# Patient Record
Sex: FEMALE | Race: WHITE | URBAN - METROPOLITAN AREA
[De-identification: names, ages, dates, MRNs, and addresses within clinical notes are randomized per-mention and may not be internally consistent; named-entity substitution may affect disease eponyms.]

---

## 2022-05-13 ENCOUNTER — OFFICE VISIT (OUTPATIENT)
Dept: URBAN - METROPOLITAN AREA CLINIC 27 | Facility: CLINIC | Age: 72
End: 2022-05-13
Payer: MEDICARE

## 2022-05-13 DIAGNOSIS — H04.123 DRY EYE SYNDROME OF BILATERAL LACRIMAL GLANDS: ICD-10-CM

## 2022-05-13 DIAGNOSIS — H43.813 VITREOUS DEGENERATION, BILATERAL: ICD-10-CM

## 2022-05-13 DIAGNOSIS — H35.349 MACULAR HOLE: Primary | ICD-10-CM

## 2022-05-13 DIAGNOSIS — H26.9 CATARACT: ICD-10-CM

## 2022-05-13 PROCEDURE — 92134 CPTRZ OPH DX IMG PST SGM RTA: CPT | Performed by: OPHTHALMOLOGY

## 2022-05-13 PROCEDURE — 99204 OFFICE O/P NEW MOD 45 MIN: CPT | Performed by: OPHTHALMOLOGY

## 2022-05-13 RX ORDER — MOXIFLOXACIN HYDROCHLORIDE 5 MG/ML
0.5 % SOLUTION/ DROPS OPHTHALMIC
Qty: 5 | Refills: 3 | Status: ACTIVE
Start: 2022-05-13

## 2022-05-13 RX ORDER — PREDNISOLONE ACETATE 10 MG/ML
1 % SUSPENSION/ DROPS OPHTHALMIC
Qty: 5 | Refills: 3 | Status: ACTIVE
Start: 2022-05-13

## 2022-05-13 ASSESSMENT — INTRAOCULAR PRESSURE
OD: 12
OS: 15

## 2022-05-13 NOTE — IMPRESSION/PLAN
Impression: Macular hole, OS. Plan: The patient notes decreased VA OS x 1 month. Exam and OCT reveal an macular hole OS (428 microns). Recommend surgery. RBA discussed. The patient elects surgery. Thanks, Dominguez Baez Plan: 25 g PPV / MP ILM / ICG / PRP / C3F8 gas OS

## 2022-05-23 ENCOUNTER — SURGERY (OUTPATIENT)
Dept: URBAN - METROPOLITAN AREA EXTERNAL CLINIC 26 | Facility: EXTERNAL CLINIC | Age: 72
End: 2022-05-23
Payer: MEDICARE

## 2022-05-23 PROCEDURE — 67041 VIT FOR MACULAR PUCKER: CPT | Performed by: OPHTHALMOLOGY

## 2022-05-24 ENCOUNTER — POST-OPERATIVE VISIT (OUTPATIENT)
Dept: URBAN - METROPOLITAN AREA CLINIC 27 | Facility: CLINIC | Age: 72
End: 2022-05-24
Payer: MEDICARE

## 2022-05-24 PROCEDURE — 99024 POSTOP FOLLOW-UP VISIT: CPT | Performed by: OPHTHALMOLOGY

## 2022-05-24 ASSESSMENT — INTRAOCULAR PRESSURE
OS: 14
OD: 15

## 2022-05-24 NOTE — IMPRESSION/PLAN
Impression: S/P  25 g PPV / MP ILM / ICG / PRP / C3F8 gas OS OS - 1 Day. Macular hole  H35.349. Plan: Doing well. Start Rx, alt precautions, and positioning.   Return 1 week (0 [facility])

## 2022-06-01 ENCOUNTER — POST-OPERATIVE VISIT (OUTPATIENT)
Dept: URBAN - METROPOLITAN AREA CLINIC 27 | Facility: CLINIC | Age: 72
End: 2022-06-01
Payer: MEDICARE

## 2022-06-01 DIAGNOSIS — H35.349 MACULAR HOLE: Primary | ICD-10-CM

## 2022-06-01 PROCEDURE — 99024 POSTOP FOLLOW-UP VISIT: CPT | Performed by: OPHTHALMOLOGY

## 2022-06-01 ASSESSMENT — INTRAOCULAR PRESSURE
OD: 15
OS: 14

## 2022-06-01 NOTE — IMPRESSION/PLAN
Impression: S/P 25 g PPV / MP ILM / ICG / PRP / C3F8 gas OS - 05/23/2022 DYK Plan: Retina is attached. No s/s of infection IOP is good at (14) Drops Vigamox QID until done Pred QID 

RTC 3 months, OCT OU

## 2022-08-31 ENCOUNTER — OFFICE VISIT (OUTPATIENT)
Dept: URBAN - METROPOLITAN AREA CLINIC 27 | Facility: CLINIC | Age: 72
End: 2022-08-31
Payer: MEDICARE

## 2022-08-31 DIAGNOSIS — H35.349 MACULAR HOLE: Primary | ICD-10-CM

## 2022-08-31 DIAGNOSIS — H04.123 DRY EYE SYNDROME OF BILATERAL LACRIMAL GLANDS: ICD-10-CM

## 2022-08-31 DIAGNOSIS — H43.813 VITREOUS DEGENERATION, BILATERAL: ICD-10-CM

## 2022-08-31 DIAGNOSIS — H26.9 CATARACT: ICD-10-CM

## 2022-08-31 PROCEDURE — 92134 CPTRZ OPH DX IMG PST SGM RTA: CPT | Performed by: OPHTHALMOLOGY

## 2022-08-31 PROCEDURE — 99214 OFFICE O/P EST MOD 30 MIN: CPT | Performed by: OPHTHALMOLOGY

## 2022-08-31 ASSESSMENT — INTRAOCULAR PRESSURE
OS: 16
OD: 21

## 2022-08-31 NOTE — IMPRESSION/PLAN
Impression: h/o Macular Hole, OS. S/P 25 g PPV / MP ILM / ICG / PRP / C3F8 gas OS - 05/23/2022 DYK Plan: Exam and OCT reveal a closed macular hole OS. The patient understands her VA potential will be limited by her history of a MH OS. Follow.  

Return in 3 months for follow up, OCT OU

## 2023-02-22 ENCOUNTER — OFFICE VISIT (OUTPATIENT)
Dept: URBAN - METROPOLITAN AREA CLINIC 27 | Facility: CLINIC | Age: 73
End: 2023-02-22
Payer: MEDICARE

## 2023-02-22 DIAGNOSIS — H04.123 DRY EYE SYNDROME OF BILATERAL LACRIMAL GLANDS: ICD-10-CM

## 2023-02-22 DIAGNOSIS — H35.349 MACULAR HOLE: Primary | ICD-10-CM

## 2023-02-22 DIAGNOSIS — H43.813 VITREOUS DEGENERATION, BILATERAL: ICD-10-CM

## 2023-02-22 DIAGNOSIS — H26.9 CATARACT: ICD-10-CM

## 2023-02-22 PROCEDURE — 92134 CPTRZ OPH DX IMG PST SGM RTA: CPT | Performed by: OPHTHALMOLOGY

## 2023-02-22 PROCEDURE — 99214 OFFICE O/P EST MOD 30 MIN: CPT | Performed by: OPHTHALMOLOGY

## 2023-02-22 ASSESSMENT — INTRAOCULAR PRESSURE
OS: 12
OD: 16

## 2023-02-22 NOTE — IMPRESSION/PLAN
Impression: h/o Macular Hole, OS. S/P 25 g PPV / MP ILM / ICG / PRP / C3F8 gas OS - 05/23/2022 DYK Plan: Exam and OCT reveal a closed macular hole OS. The patient understands her VA potential will be limited by her history of a MH OS. Follow.  

Return in 24 months for follow up, OCT OU

## 2023-02-22 NOTE — IMPRESSION/PLAN
Impression: Cataract OD / PCIOL OS Plan: Followed by Dr. Dora Ford.  Deisy for CE from the retinal standpoint